# Patient Record
Sex: FEMALE | Race: WHITE | NOT HISPANIC OR LATINO | Employment: FULL TIME | URBAN - METROPOLITAN AREA
[De-identification: names, ages, dates, MRNs, and addresses within clinical notes are randomized per-mention and may not be internally consistent; named-entity substitution may affect disease eponyms.]

---

## 2021-01-27 ENCOUNTER — TELEPHONE (OUTPATIENT)
Dept: DERMATOLOGY | Facility: CLINIC | Age: 26
End: 2021-01-27

## 2021-01-27 NOTE — TELEPHONE ENCOUNTER
[facility] Call Center    Phone Message    May a detailed message be left on voicemail: no     Reason for Call: Other: `      Pt is new and establishing care. Appt scheduled with Luna Carballo on 3/8/21, and waitlisted for sooner appt.    Pt is concerned as her current prescriptions for acne will  before she is scheduled to see Luna Carballo.     Please call Pt to discuss her medications/refills and schedule her sooner if possible.    Thank you-    Action Taken: Message routed to:  [facility] Dermatology    Travel Screening: Not Applicable

## 2021-01-27 NOTE — TELEPHONE ENCOUNTER
Voicemail left with patient offering a sooner appointment. Clinic phone number provided if the sooner appointment does not work for the patient.    Kaylie Rico, [facility]

## 2021-01-31 ENCOUNTER — HEALTH MAINTENANCE LETTER (OUTPATIENT)
Age: 26
End: 2021-01-31

## 2021-02-01 ENCOUNTER — VIRTUAL VISIT (OUTPATIENT)
Dept: DERMATOLOGY | Facility: CLINIC | Age: 26
End: 2021-02-01
Payer: COMMERCIAL

## 2021-02-01 DIAGNOSIS — L70.0 ACNE VULGARIS: Primary | ICD-10-CM

## 2021-02-01 DIAGNOSIS — L71.9 ROSACEA: ICD-10-CM

## 2021-02-01 PROCEDURE — 99203 OFFICE O/P NEW LOW 30 MIN: CPT | Mod: GQ | Performed by: DERMATOLOGY

## 2021-02-01 RX ORDER — AZELAIC ACID 0.15 G/G
GEL TOPICAL
COMMUNITY
Start: 2018-08-20 | End: 2021-03-03

## 2021-02-01 RX ORDER — CLINDAMYCIN PHOSPHATE 10 MG/G
GEL TOPICAL
COMMUNITY
Start: 2018-08-20

## 2021-02-01 RX ORDER — BENZOYL PEROXIDE 5 G/100G
GEL TOPICAL
COMMUNITY
Start: 2016-01-01

## 2021-02-01 RX ORDER — SPIRONOLACTONE 100 MG/1
TABLET, FILM COATED ORAL
COMMUNITY
Start: 2018-08-20 | End: 2021-03-03

## 2021-02-01 RX ORDER — AZELAIC ACID 0.15 G/G
GEL TOPICAL DAILY
Qty: 50 G | Refills: 3 | Status: SHIPPED | OUTPATIENT
Start: 2021-02-01

## 2021-02-01 RX ORDER — SPIRONOLACTONE 100 MG/1
100 TABLET, FILM COATED ORAL DAILY
Qty: 90 TABLET | Refills: 3 | Status: SHIPPED | OUTPATIENT
Start: 2021-02-01 | End: 2021-04-05

## 2021-02-01 ASSESSMENT — PAIN SCALES - GENERAL: PAINLEVEL: NO PAIN (0)

## 2021-02-01 NOTE — PROGRESS NOTES
[facility] Dermatology Note  Encounter Date: Feb 1, 2021  Store-and-Forward and Telephone ([phone]). Location of teledermatologist: [facility] DERMATOLOGY CLINIC [place].  Start time: 9:15. End time: 9:35.    Dermatology Problem List:  1. Acne vulgaris: spironolactone 100 mg daily, azelaic acid 15% gel nightly  - prior: benzoyl peroxide, clindamycin; remote isotretinoin  2. Erythematotelangiectatic rosacea  - using azelaic acid, but no directed treatment    ____________________________________________    Assessment & Plan:     1. Acne vulgaris: overall doing well on current regimen and looking to simplify. Will start by eliminating benzoyl peroxide and clindamycin, then reassessing. Suspect that acne will continue to remain well-controlled. If recurs, consider combination BPO-clindamycin product to limit number of different topicals to be applied. Could consider reducing spironolactone dose down the road, but given good control and reduction in topical regimen, will defer for now. No indication for lab monitoring for spironolactone given preponderance of literature demonstrating lack of utility of monitoring Cr/K in young otherwise healthy females over the last 5-6 years.  - Continue spironolactone 100 mg daily  - Continue azelaic acid 15% gel nightly  - stop benzoyl peroxide and clindamycin    2. Erythematotelangiectatic rosacea: mild. Discussed that topicals may not be beneficial for this form of rosacea and may exacerbate redness because they can be irritating. Low likelihood of attributing to prior isotretinoin. Discussed laser treatment in the future, if desired, but patient not interested at this time.    Procedures Performed:    None    Follow-up: 3 months    Staff:     Dave Yarbrough MD   of Dermatology  Department of Dermatology  [facility]    ____________________________________________    CC: Derm Problem (Acne -  Sheeba would like to discuss acne of the face.)    HPI:  Ms. Sheeba Valdez is a(n) 25 year old female who presents today as a new patient for acne and rosacea    Acne - treating for most of adult life - in last few years has been managed well with spironolactone 100 mg   - would get monthly breakouts around mouth/chin - spironolactone has helped significantly   - don't experience many side effects   - overall rest of face feels pretty clear - a few small bumps - uses benzoyl peroxide and azelaic acid    - previously had prescription for clindamycin when have worse breakout - had been blending with benzoyl peroxide and applying to chin/jawline area    Spironolactone - was having some     Patient is otherwise feeling well, without additional concerns.    Labs:  NA    Physical Exam:  Vitals: There were no vitals taken for this visit.  SKIN: Teledermatology photos were reviewed; image quality and interpretability: acceptable. Image date: see upload date.  - rare comedones on the face  - faint erythema of the cheeks  - No other lesions of concern on areas examined.     Medications:  Current Outpatient Medications   Medication     azelaic acid (FINACIA) 15 % external gel     benzoyl peroxide 5 % topical gel     clindamycin (CLINDAMAX) 1 % external gel     spironolactone (ALDACTONE) 100 MG tablet     No current facility-administered medications for this visit.       Past Medical/Surgical History:   There is no problem list on file for this patient.    No past medical history on file.    CC Referred Self, MD  No address on file on close of this encounter.

## 2021-02-01 NOTE — NURSING NOTE
Dermatology Rooming Note    Sheeba Valdez's goals for this visit include:   Chief Complaint   Patient presents with     Derm Problem     Acne - Sheeba would like to discuss acne of the face.     Kaylie Rico, [facility]

## 2021-02-01 NOTE — LETTER
2/1/2021       RE: Sheeba Valdez  [address] [place] [address]     Dear Colleague,    Thank you for referring your patient, Sheeba Valdez, to the [facility] DERMATOLOGY CLINIC [place] at [facility]. Please see a copy of my visit note below.    [facility] Dermatology Note  Encounter Date: Feb 1, 2021  Store-and-Forward and Telephone ([phone]). Location of teledermatologist: [facility] DERMATOLOGY CLINIC [place].  Start time: 9:15. End time: 9:35.    Dermatology Problem List:  1. Acne vulgaris: spironolactone 100 mg daily, azelaic acid 15% gel nightly  - prior: benzoyl peroxide, clindamycin; remote isotretinoin  2. Erythematotelangiectatic rosacea  - using azelaic acid, but no directed treatment    ____________________________________________    Assessment & Plan:     1. Acne vulgaris: overall doing well on current regimen and looking to simplify. Will start by eliminating benzoyl peroxide and clindamycin, then reassessing. Suspect that acne will continue to remain well-controlled. If recurs, consider combination BPO-clindamycin product to limit number of different topicals to be applied. Could consider reducing spironolactone dose down the road, but given good control and reduction in topical regimen, will defer for now. No indication for lab monitoring for spironolactone given preponderance of literature demonstrating lack of utility of monitoring Cr/K in young otherwise healthy females over the last 5-6 years.  - Continue spironolactone 100 mg daily  - Continue azelaic acid 15% gel nightly  - stop benzoyl peroxide and clindamycin    2. Erythematotelangiectatic rosacea: mild. Discussed that topicals may not be beneficial for this form of rosacea and may exacerbate redness because they can be irritating. Low likelihood of attributing to prior isotretinoin. Discussed laser treatment in the future, if desired, but  patient not interested at this time.    Procedures Performed:    None    Follow-up: 3 months    Staff:     Dave Yarbrough MD   of Dermatology  Department of Dermatology  [facility]    ____________________________________________    CC: Derm Problem (Acne - Sheeba would like to discuss acne of the face.)    HPI:  Ms. Sheeba Valdez is a(n) 25 year old female who presents today as a new patient for acne and rosacea    Acne - treating for most of adult life - in last few years has been managed well with spironolactone 100 mg   - would get monthly breakouts around mouth/chin - spironolactone has helped significantly   - don't experience many side effects   - overall rest of face feels pretty clear - a few small bumps - uses benzoyl peroxide and azelaic acid    - previously had prescription for clindamycin when have worse breakout - had been blending with benzoyl peroxide and applying to chin/jawline area    Spironolactone - was having some     Patient is otherwise feeling well, without additional concerns.    Labs:  NA    Physical Exam:  Vitals: There were no vitals taken for this visit.  SKIN: Teledermatology photos were reviewed; image quality and interpretability: acceptable. Image date: see upload date.  - rare comedones on the face  - faint erythema of the cheeks  - No other lesions of concern on areas examined.     Medications:  Current Outpatient Medications   Medication     azelaic acid (FINACIA) 15 % external gel     benzoyl peroxide 5 % topical gel     clindamycin (CLINDAMAX) 1 % external gel     spironolactone (ALDACTONE) 100 MG tablet     No current facility-administered medications for this visit.       Past Medical/Surgical History:   There is no problem list on file for this patient.    No past medical history on file.    CC Referred Self, MD  No address on file on close of this encounter.

## 2021-02-01 NOTE — PATIENT INSTRUCTIONS
[facility] Dermatology Visit    Thank you for allowing us to participate in your care. Your findings, instructions and follow-up plan are as follows:     Continue spironolactone 100 mg daily  Continue azelaic acid 15% gel daily  Stop benzoyl peroxide and clindamycin for now     When should I call my doctor?    If you are worsening or not improving, please, contact us or seek urgent care as noted below.     Who should I call with questions (adults)?    [facility] (adult and pediatric): [phone]     [facility] (adult): [phone]    For urgent needs outside of business hours call the [facility] at [phone] and ask for the dermatology resident on call    If this is a medical emergency and you are unable to reach an ER, Call 911      Who should I call with questions (pediatric)?  [facility]- Pediatric Dermatology  Dr. Jamee Damon, Dr. Zora Gunter, Dr. Dorita Gannon, Hemalatha Méndez, PA  Dr. Jacki Tillman, Dr. Gilma Archer & Dr. Dave Le  Non Urgent  Nurse Triage Line; [phone]- Venessa and Thais CROSS Care Coordinators   [place] (/Complex ) [phone]    If you need a prescription refill, please contact your pharmacy. Refills are approved or denied by our Physicians during normal business hours, Monday through Fridays  Per office policy, refills will not be granted if you have not been seen within the past year (or sooner depending on your child's condition)    Scheduling Information:  Pediatric Appointment Scheduling and Call Center [phone]  Radiology Scheduling- [phone]  Sedation Unit Scheduling- [phone]  [place] Scheduling- General [phone]; Pediatric Dermatology [phone]  Main  Services: [phone]  Guatemalan: [phone]  Uruguayan: [phone]  Hmong/Edwardo/Albanian: [phone]  Preadmission Nursing Department  Fax Number: [phone] (Fax all pre-operative paperwork to this number)    For urgent matters arising during evenings, weekends, or holidays that cannot wait for normal business hours please call [phone] and ask for the Dermatology Resident On-Call to be paged.

## 2021-02-28 ASSESSMENT — ANXIETY QUESTIONNAIRES
GAD7 TOTAL SCORE: 3
7. FEELING AFRAID AS IF SOMETHING AWFUL MIGHT HAPPEN: NOT AT ALL
1. FEELING NERVOUS, ANXIOUS, OR ON EDGE: SEVERAL DAYS
6. BECOMING EASILY ANNOYED OR IRRITABLE: NOT AT ALL
5. BEING SO RESTLESS THAT IT IS HARD TO SIT STILL: SEVERAL DAYS
7. FEELING AFRAID AS IF SOMETHING AWFUL MIGHT HAPPEN: NOT AT ALL
3. WORRYING TOO MUCH ABOUT DIFFERENT THINGS: NOT AT ALL
2. NOT BEING ABLE TO STOP OR CONTROL WORRYING: NOT AT ALL
GAD7 TOTAL SCORE: 3
4. TROUBLE RELAXING: SEVERAL DAYS

## 2021-03-02 NOTE — PROGRESS NOTES
[facility]  Gynecology Visit    CC: annual exam     HPI:    Sheeba Valdez is a 25 year old P0, here for annual exam. Moved from [place]; after recently finished grad school in December. She says that overall she has been doing well. She feels overall that she is a healthy person. She has been trying to stay active. Reports a healthy diet. Only complaint is intermittent vulvar pruritis. She recently changed soap and has not been using it on vulva. Denies any pruritis for last 2-3 weeks. She has no other complaints.     Obstetrics History:  - never pregnant     Gynecologic History:  - LMP: Patient's last menstrual period was 02/19/2021.  - Last Pap: unsure 2-3 years ago   - Denies any history of abnormal pap smears  - Denies prior cervical surgery or procedures  - Denies any history of frequent UTIs, vaginal infections, or STIs  - Menses: cycles monthly, lasting 5-6 days, with moderate flow, mild cramps  - Contraception: Paragard   - Sexual Activity: one partner     Past Medical History:  Denies    Past Surgical History:  Denies    Current Medications:  Paragard   Prior to Admission medications    Medication Sig Last Dose Taking? Auth Provider   azelaic acid (FINACIA) 15 % external gel    Reported, Patient   azelaic acid (FINACIA) 15 % external gel Apply topically daily   Dave Yarbrough MD   benzoyl peroxide 5 % topical gel    Reported, Patient   clindamycin (CLINDAMAX) 1 % external gel    Reported, Patient   spironolactone (ALDACTONE) 100 MG tablet    Reported, Patient   spironolactone (ALDACTONE) 100 MG tablet Take 1 tablet (100 mg) by mouth daily   Dave Yarbrough MD     Allergies:  Patient has no known allergies.    Social History:   Recently finished grad school, studied [employer]. Currently working from home   Lives with mom at home, feels safe  Alcohol use= 1 glass 3x/week  No drug use   No tobacco use     Family History:  Mother breast cancer at age 55. Unsure of genetic testing  Father SCC skin  "cancer   Paternal grandfather- stroke at age 50   Maternal grandfather - stroke at age 70  Family History   Problem Relation Age of Onset     Breast Cancer Mother      Other Cancer Father         Squamous cell cancer     ROS:  10-point ROS negative except as in HPI     Answers for HPI/ROS submitted by the patient on 2/28/2021   MINGO 7 TOTAL SCORE: 3  General Symptoms: No  Skin Symptoms: Yes  HENT Symptoms: No  EYE SYMPTOMS: No  HEART SYMPTOMS: No  LUNG SYMPTOMS: No  INTESTINAL SYMPTOMS: No  URINARY SYMPTOMS: No  GYNECOLOGIC SYMPTOMS: No  BREAST SYMPTOMS: No  SKELETAL SYMPTOMS: No  BLOOD SYMPTOMS: No  NERVOUS SYSTEM SYMPTOMS: No  MENTAL HEALTH SYMPTOMS: No  Itching: Yes  Rashes: Yes    Physical Exam  BP 98/65   Pulse 59   Ht 1.651 m (5' 5\")   Wt 56 kg (123 lb 8 oz)   LMP 02/19/2021   BMI 20.55 kg/m    Gen: Well-appearing, NAD  HEENT: Normocephalic, atraumatic  CV:  RRR, no m/r/g auscultated  Pulm: CTAB, no w/r/r auscultated  Abd: Soft, non-tender, non-distended  Ext: No LE edema, extremities warm and well perfused    Breast Exam:  Breast: Without visible skin changes. No dimpling or lesions seen.   Breasts supple, non-tender with palpation, no dominant mass, nodularity, or nipple discharge noted bilaterally. Axillary nodes negative.      Pelvic Exam:  EG/BUS: Normal genital architecture without lesions, erythema or abnormal secretions Bartholin's, Urethra, [place]'s normal   Urethral meatus: normal   Urethra: no masses, tenderness, or scarring   Bladder: no masses or tenderness   Vagina: moist, pink, rugae with creamy, white and odorless  secretions  Cervix: Nulliparous,, no lesions and IUD strings extend 2 cm from external os.  Uterus: anteverted,  and small, smooth, firm, mobile w/o pain  Adnexa: Within normal limits and No masses, nodularity, tenderness  Rectum:anus normal     I have reviewed labs and imaging    Assessment/Plan  Sheeba Valdez is a 25 year old P0, here for annual exam. Patient doing well " with no complaints. She has no chronic medical conditions.     #Preventative health   - Cervical cancer screening- pap smear today.  If normal repeat in three years  - STI screening: Gc/Ch collected, HIV, RPR ordered. Patient okay without hepatitis testing  - Additional teaching done at this visit regarding calcium, self breast exam, exercise, birth control and weight/diet.    Okay with results via [employer].    Patient staffed with Dr. Ronn Van MD  OB/GYN Resident, PGY-4  3/3/2021, 5:34 PM    The Patient was seen in Resident Continuity Clinic by MARCELINO VAN.  I reviewed the history & exam. Assessment and plan were jointly made.    Aracelis Brody MD

## 2021-03-03 ENCOUNTER — OFFICE VISIT (OUTPATIENT)
Dept: OBGYN | Facility: CLINIC | Age: 26
End: 2021-03-03
Payer: COMMERCIAL

## 2021-03-03 VITALS
HEIGHT: 65 IN | BODY MASS INDEX: 20.58 KG/M2 | HEART RATE: 59 BPM | WEIGHT: 123.5 LBS | DIASTOLIC BLOOD PRESSURE: 65 MMHG | SYSTOLIC BLOOD PRESSURE: 98 MMHG

## 2021-03-03 DIAGNOSIS — Z12.4 CERVICAL CANCER SCREENING: ICD-10-CM

## 2021-03-03 DIAGNOSIS — Z11.3 ROUTINE SCREENING FOR STI (SEXUALLY TRANSMITTED INFECTION): ICD-10-CM

## 2021-03-03 DIAGNOSIS — Z00.00 VISIT FOR PREVENTIVE HEALTH EXAMINATION: Primary | ICD-10-CM

## 2021-03-03 PROCEDURE — 87591 N.GONORRHOEAE DNA AMP PROB: CPT | Performed by: OBSTETRICS & GYNECOLOGY

## 2021-03-03 PROCEDURE — G0145 SCR C/V CYTO,THINLAYER,RESCR: HCPCS | Performed by: OBSTETRICS & GYNECOLOGY

## 2021-03-03 PROCEDURE — 87491 CHLMYD TRACH DNA AMP PROBE: CPT | Performed by: OBSTETRICS & GYNECOLOGY

## 2021-03-03 PROCEDURE — G0463 HOSPITAL OUTPT CLINIC VISIT: HCPCS | Mod: 25

## 2021-03-03 PROCEDURE — 99385 PREV VISIT NEW AGE 18-39: CPT | Mod: GE | Performed by: OBSTETRICS & GYNECOLOGY

## 2021-03-03 ASSESSMENT — PATIENT HEALTH QUESTIONNAIRE - PHQ9
SUM OF ALL RESPONSES TO PHQ QUESTIONS 1-9: 2
5. POOR APPETITE OR OVEREATING: SEVERAL DAYS

## 2021-03-03 ASSESSMENT — ANXIETY QUESTIONNAIRES
5. BEING SO RESTLESS THAT IT IS HARD TO SIT STILL: NOT AT ALL
7. FEELING AFRAID AS IF SOMETHING AWFUL MIGHT HAPPEN: NOT AT ALL
2. NOT BEING ABLE TO STOP OR CONTROL WORRYING: NOT AT ALL
1. FEELING NERVOUS, ANXIOUS, OR ON EDGE: SEVERAL DAYS
3. WORRYING TOO MUCH ABOUT DIFFERENT THINGS: NOT AT ALL
6. BECOMING EASILY ANNOYED OR IRRITABLE: NOT AT ALL
GAD7 TOTAL SCORE: 2

## 2021-03-03 ASSESSMENT — MIFFLIN-ST. JEOR: SCORE: 1301.07

## 2021-03-03 NOTE — PATIENT INSTRUCTIONS

## 2021-03-04 LAB
C TRACH DNA SPEC QL NAA+PROBE: NEGATIVE
N GONORRHOEA DNA SPEC QL NAA+PROBE: NEGATIVE
SPECIMEN SOURCE: NORMAL
SPECIMEN SOURCE: NORMAL

## 2021-03-07 LAB
COPATH REPORT: NORMAL
PAP: NORMAL

## 2021-05-03 ENCOUNTER — VIRTUAL VISIT (OUTPATIENT)
Dept: DERMATOLOGY | Facility: CLINIC | Age: 26
End: 2021-05-03
Payer: COMMERCIAL

## 2021-05-03 DIAGNOSIS — L70.0 ACNE VULGARIS: Primary | ICD-10-CM

## 2021-05-03 DIAGNOSIS — L71.9 ROSACEA: ICD-10-CM

## 2021-05-03 PROCEDURE — 99213 OFFICE O/P EST LOW 20 MIN: CPT | Mod: TEL | Performed by: DERMATOLOGY

## 2021-05-03 ASSESSMENT — PAIN SCALES - GENERAL: PAINLEVEL: NO PAIN (0)

## 2021-05-03 NOTE — LETTER
5/3/2021       RE: Sheeba Valdez  [address] [place] [address]     Dear Colleague,    Thank you for referring your patient, Sheeba Valdez, to the [facility] DERMATOLOGY CLINIC [place] at [facility]. Please see a copy of my visit note below.    [facility] Dermatology Note  Encounter Date: May 3, 2021  Store-and-Forward and Telephone ([phone]). Location of teledermatologist: [facility] DERMATOLOGY CLINIC [place].  Start time: 9:54. End time: 10:10.    Dermatology Problem List:  1. Acne vulgaris: spironolactone 100 mg daily, azelaic acid 15% gel nightly, benzoyl peroxide; clindamycin PRN  - prior: remote isotretinoin  2. Erythematotelangiectatic rosacea  - using azelaic acid, but no directed treatment    ____________________________________________    Assessment & Plan:     1. Acne vulgaris: overall doing well on current regimen but has had some increase perimenstrual acne on chin/cutaneous lips since stopping benzoyl peroxide and clindamycin. Will start by re-adding these topicals but if remains more active, would increase spironolactone to 125-150 mg at bedtime.  - Continue spironolactone 100 mg daily  - Continue azelaic acid 15% gel nightly  - start benzoyl peroxide daily and clindamycin PRN spot treatment    Procedures Performed:    None    Follow-up: 6 weeks    Over 20 minutes was spent during this visit, including >16 minutes of direct contact time with the patient counseling and coordinating care including the discussion of diagnosis, natural history, treatment, and prognosis as documented above. This excludes time spent performing any relevant procedures.    Staff:     Dave Yarbrough MD   of Dermatology  Department of Dermatology  [facility]      ____________________________________________    CC: Derm Problem (Acne - Sheeba states she has been  stable and tried using less products but then broke out more)    HPI:  Ms. Sheeba Valdez is a(n) 25 year old female who presents today as a new patient for acne and rosacea    Acne - over doing well  - have had a few more breakouts than typical in last 2 months - mostly on chin around menstruation  - had reduced some of the topical regimen at last visit  - also increased stress with new job and pandemic  - restarted OTC benzoyl peroxide  - deeper pimples around chin; sometimes around lips and in oral commissures - seems to recur in these same areas    Patient is otherwise feeling well, without additional concerns.    Labs:  N/A    Physical Exam:  Vitals: There were no vitals taken for this visit.  SKIN: Teledermatology photos were reviewed; image quality and interpretability: acceptable. Image date: 1/31/21.  - rare comedones on the face  - faint erythema of the cheeks  - No other lesions of concern on areas examined.     Medications:  Current Outpatient Medications   Medication     azelaic acid (FINACIA) 15 % external gel     benzoyl peroxide 5 % topical gel     clindamycin (CLINDAMAX) 1 % external gel     spironolactone (ALDACTONE) 100 MG tablet     No current facility-administered medications for this visit.       Past Medical/Surgical History:   Patient Active Problem List   Diagnosis     Acne vulgaris     No past medical history on file.    CC Referred Self, MD  No address on file on close of this encounter.

## 2021-05-03 NOTE — PATIENT INSTRUCTIONS
Restart benzoyl peroxide daily  Restart clindamycin for spot treatment as needed  Continue spironolactone 100 mg at bedtime  Continue azelaic acid at bedtime    Plan for follow up in 6 weeks - if not better, we'll plan to increase spironolactone. If we aren't able to have an official visit due to you moving to [facility], we can plan to correspond by [employer] or a quick unofficial phone call.

## 2021-05-03 NOTE — NURSING NOTE
Dermatology Rooming Note    Sheeba Valdez's goals for this visit include:   Chief Complaint   Patient presents with     Derm Problem     Acne - Sheeba states she has been stable and tried using less products but then broke out more     Kaylie Rico, [facility]

## 2021-05-03 NOTE — PROGRESS NOTES
[facility] Dermatology Note  Encounter Date: May 3, 2021  Store-and-Forward and Telephone ([phone]). Location of teledermatologist: [facility] DERMATOLOGY CLINIC [place].  Start time: 9:54. End time: 10:10.    Dermatology Problem List:  1. Acne vulgaris: spironolactone 100 mg daily, azelaic acid 15% gel nightly, benzoyl peroxide; clindamycin PRN  - prior: remote isotretinoin  2. Erythematotelangiectatic rosacea  - using azelaic acid, but no directed treatment    ____________________________________________    Assessment & Plan:     1. Acne vulgaris: overall doing well on current regimen but has had some increase perimenstrual acne on chin/cutaneous lips since stopping benzoyl peroxide and clindamycin. Will start by re-adding these topicals but if remains more active, would increase spironolactone to 125-150 mg at bedtime.  - Continue spironolactone 100 mg daily  - Continue azelaic acid 15% gel nightly  - start benzoyl peroxide daily and clindamycin PRN spot treatment    Procedures Performed:    None    Follow-up: 6 weeks    Over 20 minutes was spent during this visit, including >16 minutes of direct contact time with the patient counseling and coordinating care including the discussion of diagnosis, natural history, treatment, and prognosis as documented above. This excludes time spent performing any relevant procedures.    Staff:     Dave Yarbrough MD   of Dermatology  Department of Dermatology  [facility]      ____________________________________________    CC: Derm Problem (Acne - Sheeba states she has been stable and tried using less products but then broke out more)    HPI:  Ms. Sheeba Valdez is a(n) 25 year old female who presents today as a new patient for acne and rosacea    Acne - over doing well  - have had a few more breakouts than typical in last 2 months - mostly on chin around menstruation  - had reduced some  of the topical regimen at last visit  - also increased stress with new job and pandemic  - restarted OTC benzoyl peroxide  - deeper pimples around chin; sometimes around lips and in oral commissures - seems to recur in these same areas    Patient is otherwise feeling well, without additional concerns.    Labs:  N/A    Physical Exam:  Vitals: There were no vitals taken for this visit.  SKIN: Teledermatology photos were reviewed; image quality and interpretability: acceptable. Image date: 1/31/21.  - rare comedones on the face  - faint erythema of the cheeks  - No other lesions of concern on areas examined.     Medications:  Current Outpatient Medications   Medication     azelaic acid (FINACIA) 15 % external gel     benzoyl peroxide 5 % topical gel     clindamycin (CLINDAMAX) 1 % external gel     spironolactone (ALDACTONE) 100 MG tablet     No current facility-administered medications for this visit.       Past Medical/Surgical History:   Patient Active Problem List   Diagnosis     Acne vulgaris     No past medical history on file.    CC Referred Self, MD  No address on file on close of this encounter.

## 2021-06-14 ENCOUNTER — VIRTUAL VISIT (OUTPATIENT)
Dept: DERMATOLOGY | Facility: CLINIC | Age: 26
End: 2021-06-14
Payer: COMMERCIAL

## 2021-06-14 DIAGNOSIS — L70.0 ACNE VULGARIS: Primary | ICD-10-CM

## 2021-06-14 PROCEDURE — 99213 OFFICE O/P EST LOW 20 MIN: CPT | Mod: TEL | Performed by: DERMATOLOGY

## 2021-06-14 ASSESSMENT — PAIN SCALES - GENERAL: PAINLEVEL: NO PAIN (0)

## 2021-06-14 NOTE — NURSING NOTE
Chief Complaint   Patient presents with     Acne     Sheeba, is here for an acne appt, no other concerns      Kashif Harding EMT

## 2021-06-14 NOTE — PROGRESS NOTES
[facility] Dermatology Note  Encounter Date: Jun 14, 2021  Store-and-Forward and Telephone ([phone] ). Location of teledermatologist: [facility] [place].  Start time: 8:57. End time: 9:02.    Dermatology Problem List:  1. Acne vulgaris: spironolactone 100 mg daily, azelaic acid 15% gel nightly, benzoyl peroxide; clindamycin PRN  - prior: remote isotretinoin  2. Erythematotelangiectatic rosacea  - using azelaic acid, but no directed treatment    ____________________________________________    Assessment & Plan:     1. Acne vulgaris: stable on current regimen with spironolactone 100 mg daily and topicals. Recently moved to [facility] area and looking to establish care locally. Will continue current regimen at this time given good stability and lack of ongoing activity.  - continue spironolactone 100 mg daily and topicals    Procedures Performed:    None    Follow-up: as needed    Staff:     Dave Yarbrough MD   of Dermatology  Department of Dermatology  [facility]    ____________________________________________    CC: Acne (Sheeba, is here for an acne appt, no other concerns )    HPI:  Ms. Sheeba Valdez is a(n) 26 year old female who presents today as a return patient for acne vulgaris    Overall skin care routine working well - not having a lot of active acne  - would like to continue current regimen  - have relocated to [facility]    Patient is otherwise feeling well, without additional skin concerns.    Labs Reviewed:  N/A    Physical Exam:  Vitals: There were no vitals taken for this visit.  SKIN: Teledermatology photos were reviewed; image quality and interpretability: acceptable. Image date: 6/13/21.  - no active acneiform lesions identified  - No other lesions of concern on areas examined.     Medications:  Current Outpatient Medications   Medication     azelaic acid (FINACIA) 15 % external gel      benzoyl peroxide 5 % topical gel     clindamycin (CLINDAMAX) 1 % external gel     spironolactone (ALDACTONE) 100 MG tablet     No current facility-administered medications for this visit.       Past Medical/Surgical History:   Patient Active Problem List   Diagnosis     Acne vulgaris     No past medical history on file.    CC Referred Self, MD  No address on file on close of this encounter.

## 2021-06-14 NOTE — LETTER
6/14/2021       RE: Sheeba Valdez  [address] [place] [address]     Dear Colleague,    Thank you for referring your patient, Sheeba Valdez, to the [facility] [place] at [facility]. Please see a copy of my visit note below.    [facility] Dermatology Note  Encounter Date: Jun 14, 2021  Store-and-Forward and Telephone ([phone] ). Location of teledermatologist: [facility] [place].  Start time: 8:57. End time: 9:02.    Dermatology Problem List:  1. Acne vulgaris: spironolactone 100 mg daily, azelaic acid 15% gel nightly, benzoyl peroxide; clindamycin PRN  - prior: remote isotretinoin  2. Erythematotelangiectatic rosacea  - using azelaic acid, but no directed treatment    ____________________________________________    Assessment & Plan:     1. Acne vulgaris: stable on current regimen with spironolactone 100 mg daily and topicals. Recently moved to [facility] area and looking to establish care locally. Will continue current regimen at this time given good stability and lack of ongoing activity.  - continue spironolactone 100 mg daily and topicals    Procedures Performed:    None    Follow-up: as needed    Staff:     Dave Yarbrough MD   of Dermatology  Department of Dermatology  [facility]    ____________________________________________    CC: Acne (Sheeba, is here for an acne appt, no other concerns )    HPI:  Ms. Sheeba Valdez is a(n) 26 year old female who presents today as a return patient for acne vulgaris    Overall skin care routine working well - not having a lot of active acne  - would like to continue current regimen  - have relocated to [facility]    Patient is otherwise feeling well, without additional skin concerns.    Labs Reviewed:  N/A    Physical Exam:  Vitals: There were no vitals taken for  this visit.  SKIN: Teledermatology photos were reviewed; image quality and interpretability: acceptable. Image date: 6/13/21.  - no active acneiform lesions identified  - No other lesions of concern on areas examined.     Medications:  Current Outpatient Medications   Medication     azelaic acid (FINACIA) 15 % external gel     benzoyl peroxide 5 % topical gel     clindamycin (CLINDAMAX) 1 % external gel     spironolactone (ALDACTONE) 100 MG tablet     No current facility-administered medications for this visit.       Past Medical/Surgical History:   Patient Active Problem List   Diagnosis     Acne vulgaris     No past medical history on file.    CC Referred Self, MD  No address on file on close of this encounter.

## 2021-09-11 ENCOUNTER — HEALTH MAINTENANCE LETTER (OUTPATIENT)
Age: 26
End: 2021-09-11

## 2022-04-21 DIAGNOSIS — L70.0 ACNE VULGARIS: ICD-10-CM

## 2022-04-23 ENCOUNTER — HEALTH MAINTENANCE LETTER (OUTPATIENT)
Age: 27
End: 2022-04-23

## 2022-04-23 RX ORDER — SPIRONOLACTONE 100 MG/1
100 TABLET, FILM COATED ORAL DAILY
Qty: 90 TABLET | Refills: 0 | Status: SHIPPED | OUTPATIENT
Start: 2022-04-23

## 2022-04-23 NOTE — TELEPHONE ENCOUNTER
Received refill request as ALAYNA. Chart (including notes and pertinent labs) reviewed.  Attending Dr. Yarbrough CC'd as an FYI.      Per last note (see below), [place] Will provide courtesy 3 month refill, however, future refills need to be done by patient's new provider.     1. Acne vulgaris: stable on current regimen with spironolactone 100 mg daily and topicals. Recently moved to [facility] and looking to establish care locally. Will continue current regimen at this time given good stability and lack of ongoing activity.  - continue spironolactone 100 mg daily and topicals    Marisol Arreaga MD ([id number])  Dermatology Resident

## 2022-04-23 NOTE — TELEPHONE ENCOUNTER
spironolactone (ALDACTONE) 100 MG tablet    Last Written Prescription Date: 4/5/21  Last Fill Quantity: 90,   # refills: 3  Last Office Visit : 6/14/21  Future Office visit: none    Process 2    Routing refill request to provider for review/approval because: bp,creat, NA, K+

## 2022-10-30 ENCOUNTER — HEALTH MAINTENANCE LETTER (OUTPATIENT)
Age: 27
End: 2022-10-30

## 2023-06-01 ENCOUNTER — HEALTH MAINTENANCE LETTER (OUTPATIENT)
Age: 28
End: 2023-06-01

## 2024-06-09 ENCOUNTER — HEALTH MAINTENANCE LETTER (OUTPATIENT)
Age: 29
End: 2024-06-09

## 2024-11-03 ASSESSMENT — ANXIETY QUESTIONNAIRES: GAD7 TOTAL SCORE: 3
